# Patient Record
Sex: FEMALE | Race: WHITE | NOT HISPANIC OR LATINO | Employment: UNEMPLOYED | URBAN - METROPOLITAN AREA
[De-identification: names, ages, dates, MRNs, and addresses within clinical notes are randomized per-mention and may not be internally consistent; named-entity substitution may affect disease eponyms.]

---

## 2017-02-06 ENCOUNTER — HOSPITAL ENCOUNTER (OUTPATIENT)
Dept: PHYSICAL THERAPY | Facility: MEDICAL CENTER | Age: 32
End: 2017-02-06
Attending: FAMILY MEDICINE
Payer: COMMERCIAL

## 2017-02-06 PROCEDURE — 97162 PT EVAL MOD COMPLEX 30 MIN: CPT

## 2017-02-06 PROCEDURE — 97110 THERAPEUTIC EXERCISES: CPT

## 2017-02-13 ENCOUNTER — HOSPITAL ENCOUNTER (OUTPATIENT)
Dept: PHYSICAL THERAPY | Facility: MEDICAL CENTER | Age: 32
End: 2017-02-13
Attending: FAMILY MEDICINE
Payer: COMMERCIAL

## 2017-02-13 PROCEDURE — 97140 MANUAL THERAPY 1/> REGIONS: CPT

## 2017-02-13 PROCEDURE — 97110 THERAPEUTIC EXERCISES: CPT

## 2017-02-20 ENCOUNTER — HOSPITAL ENCOUNTER (OUTPATIENT)
Dept: PHYSICAL THERAPY | Facility: MEDICAL CENTER | Age: 32
End: 2017-02-20
Attending: FAMILY MEDICINE
Payer: COMMERCIAL

## 2017-02-20 PROCEDURE — 97110 THERAPEUTIC EXERCISES: CPT

## 2017-02-20 PROCEDURE — 97140 MANUAL THERAPY 1/> REGIONS: CPT

## 2017-02-27 ENCOUNTER — HOSPITAL ENCOUNTER (OUTPATIENT)
Dept: PHYSICAL THERAPY | Facility: MEDICAL CENTER | Age: 32
End: 2017-02-27
Attending: FAMILY MEDICINE
Payer: COMMERCIAL

## 2017-02-27 PROCEDURE — 97110 THERAPEUTIC EXERCISES: CPT

## 2017-02-27 PROCEDURE — 97140 MANUAL THERAPY 1/> REGIONS: CPT

## 2017-02-28 ENCOUNTER — HOSPITAL ENCOUNTER (OUTPATIENT)
Dept: LAB | Facility: MEDICAL CENTER | Age: 32
End: 2017-02-28
Attending: FAMILY MEDICINE
Payer: COMMERCIAL

## 2017-02-28 DIAGNOSIS — Z00.00 HEALTH MAINTENANCE EXAMINATION: ICD-10-CM

## 2017-02-28 LAB
25(OH)D3 SERPL-MCNC: 21 NG/ML (ref 30–100)
ALBUMIN SERPL BCP-MCNC: 4.6 G/DL (ref 3.2–4.9)
ALBUMIN/GLOB SERPL: 1.2 G/DL
ALP SERPL-CCNC: 46 U/L (ref 30–99)
ALT SERPL-CCNC: 19 U/L (ref 2–50)
ANION GAP SERPL CALC-SCNC: 8 MMOL/L (ref 0–11.9)
AST SERPL-CCNC: 20 U/L (ref 12–45)
BASOPHILS # BLD AUTO: 0.06 K/UL (ref 0–0.12)
BASOPHILS NFR BLD AUTO: 0.7 % (ref 0–1.8)
BILIRUB SERPL-MCNC: 1.7 MG/DL (ref 0.1–1.5)
BUN SERPL-MCNC: 17 MG/DL (ref 8–22)
CALCIUM SERPL-MCNC: 9.9 MG/DL (ref 8.5–10.5)
CHLORIDE SERPL-SCNC: 104 MMOL/L (ref 96–112)
CHOLEST SERPL-MCNC: 174 MG/DL (ref 100–199)
CO2 SERPL-SCNC: 25 MMOL/L (ref 20–33)
CREAT SERPL-MCNC: 0.8 MG/DL (ref 0.5–1.4)
EOSINOPHIL # BLD: 0.1 K/UL (ref 0–0.51)
EOSINOPHIL NFR BLD AUTO: 1.2 % (ref 0–6.9)
ERYTHROCYTE [DISTWIDTH] IN BLOOD BY AUTOMATED COUNT: 47 FL (ref 35.9–50)
GLOBULIN SER CALC-MCNC: 3.8 G/DL (ref 1.9–3.5)
GLUCOSE SERPL-MCNC: 89 MG/DL (ref 65–99)
HCT VFR BLD AUTO: 46 % (ref 37–47)
HDLC SERPL-MCNC: 97 MG/DL
HGB BLD-MCNC: 15.2 G/DL (ref 12–16)
IMM GRANULOCYTES # BLD AUTO: 0.02 K/UL (ref 0–0.11)
IMM GRANULOCYTES NFR BLD AUTO: 0.2 % (ref 0–0.9)
LDLC SERPL CALC-MCNC: 69 MG/DL
LYMPHOCYTES # BLD: 2.52 K/UL (ref 1–4.8)
LYMPHOCYTES NFR BLD AUTO: 31.1 % (ref 22–41)
MCH RBC QN AUTO: 30.3 PG (ref 27–33)
MCHC RBC AUTO-ENTMCNC: 33 G/DL (ref 33.6–35)
MCV RBC AUTO: 91.8 FL (ref 81.4–97.8)
MONOCYTES # BLD: 0.6 K/UL (ref 0–0.85)
MONOCYTES NFR BLD AUTO: 7.4 % (ref 0–13.4)
NEUTROPHILS # BLD: 4.81 K/UL (ref 2–7.15)
NEUTROPHILS NFR BLD AUTO: 59.4 % (ref 44–72)
NRBC # BLD AUTO: 0 K/UL
NRBC BLD-RTO: 0 /100 WBC
PLATELET # BLD AUTO: 263 K/UL (ref 164–446)
PMV BLD AUTO: 9.8 FL (ref 9–12.9)
POTASSIUM SERPL-SCNC: 3.8 MMOL/L (ref 3.6–5.5)
PROT SERPL-MCNC: 8.4 G/DL (ref 6–8.2)
RBC # BLD AUTO: 5.01 M/UL (ref 4.2–5.4)
SODIUM SERPL-SCNC: 137 MMOL/L (ref 135–145)
TRIGL SERPL-MCNC: 42 MG/DL (ref 0–149)
TSH SERPL DL<=0.005 MIU/L-ACNC: 0.74 UIU/ML (ref 0.3–3.7)
WBC # BLD AUTO: 8.1 K/UL (ref 4.8–10.8)

## 2017-02-28 PROCEDURE — 36415 COLL VENOUS BLD VENIPUNCTURE: CPT

## 2017-02-28 PROCEDURE — 85025 COMPLETE CBC W/AUTO DIFF WBC: CPT

## 2017-02-28 PROCEDURE — 84443 ASSAY THYROID STIM HORMONE: CPT

## 2017-02-28 PROCEDURE — 80053 COMPREHEN METABOLIC PANEL: CPT

## 2017-02-28 PROCEDURE — 82306 VITAMIN D 25 HYDROXY: CPT

## 2017-02-28 PROCEDURE — 80061 LIPID PANEL: CPT

## 2017-03-01 DIAGNOSIS — E80.6 HYPERBILIRUBINEMIA: ICD-10-CM

## 2017-03-24 ENCOUNTER — OFFICE VISIT (OUTPATIENT)
Dept: MEDICAL GROUP | Facility: LAB | Age: 32
End: 2017-03-24
Payer: COMMERCIAL

## 2017-03-24 ENCOUNTER — HOSPITAL ENCOUNTER (OUTPATIENT)
Dept: LAB | Facility: MEDICAL CENTER | Age: 32
End: 2017-03-24
Attending: FAMILY MEDICINE
Payer: COMMERCIAL

## 2017-03-24 VITALS
TEMPERATURE: 99 F | WEIGHT: 105 LBS | DIASTOLIC BLOOD PRESSURE: 80 MMHG | HEIGHT: 60 IN | OXYGEN SATURATION: 98 % | HEART RATE: 70 BPM | SYSTOLIC BLOOD PRESSURE: 100 MMHG | BODY MASS INDEX: 20.62 KG/M2

## 2017-03-24 DIAGNOSIS — M54.2 NECK PAIN: ICD-10-CM

## 2017-03-24 DIAGNOSIS — E55.9 VITAMIN D DEFICIENCY: ICD-10-CM

## 2017-03-24 DIAGNOSIS — E80.6 HYPERBILIRUBINEMIA: ICD-10-CM

## 2017-03-24 DIAGNOSIS — R35.0 URINARY FREQUENCY: ICD-10-CM

## 2017-03-24 LAB
APPEARANCE UR: CLEAR
BILIRUB CONJ SERPL-MCNC: 0.2 MG/DL (ref 0.1–0.5)
BILIRUB INDIRECT SERPL-MCNC: 0.5 MG/DL (ref 0–1)
BILIRUB SERPL-MCNC: 0.7 MG/DL (ref 0.1–1.5)
BILIRUB UR QL STRIP.AUTO: NEGATIVE
COLOR UR AUTO: COLORLESS
CRP SERPL HS-MCNC: 0.03 MG/DL (ref 0–0.75)
CULTURE IF INDICATED INDCX: NO UA CULTURE
ERYTHROCYTE [SEDIMENTATION RATE] IN BLOOD BY WESTERGREN METHOD: 6 MM/HOUR (ref 0–20)
GLUCOSE UR STRIP.AUTO-MCNC: NEGATIVE MG/DL
HAV IGM SERPL QL IA: NEGATIVE
HBV CORE IGM SERPL QL IA: NEGATIVE
HBV SURFACE AG SERPL QL IA: NEGATIVE
HCV AB S/CO SERPL IA: NEGATIVE
KETONES UR STRIP.AUTO-MCNC: NEGATIVE MG/DL
LEUKOCYTE ESTERASE UR QL STRIP.AUTO: NEGATIVE
MICRO URNS: NORMAL
NITRITE UR QL STRIP.AUTO: NEGATIVE
PH UR: 6.5 [PH]
PROT UR QL STRIP: NEGATIVE MG/DL
RBC UR QL AUTO: NEGATIVE
RHEUMATOID FACT SERPL-ACNC: <10 IU/ML (ref 0–14)
SP GR UR STRIP.AUTO: 1.01
URATE SERPL-MCNC: 3.7 MG/DL (ref 1.9–8.2)

## 2017-03-24 PROCEDURE — 99214 OFFICE O/P EST MOD 30 MIN: CPT | Performed by: FAMILY MEDICINE

## 2017-03-24 PROCEDURE — 80074 ACUTE HEPATITIS PANEL: CPT

## 2017-03-24 PROCEDURE — 86140 C-REACTIVE PROTEIN: CPT

## 2017-03-24 PROCEDURE — 82247 BILIRUBIN TOTAL: CPT

## 2017-03-24 PROCEDURE — 85652 RBC SED RATE AUTOMATED: CPT

## 2017-03-24 PROCEDURE — 86038 ANTINUCLEAR ANTIBODIES: CPT

## 2017-03-24 PROCEDURE — 36415 COLL VENOUS BLD VENIPUNCTURE: CPT

## 2017-03-24 PROCEDURE — 81003 URINALYSIS AUTO W/O SCOPE: CPT

## 2017-03-24 PROCEDURE — 82248 BILIRUBIN DIRECT: CPT

## 2017-03-24 PROCEDURE — 86431 RHEUMATOID FACTOR QUANT: CPT

## 2017-03-24 PROCEDURE — 84550 ASSAY OF BLOOD/URIC ACID: CPT

## 2017-03-24 NOTE — PROGRESS NOTES
Subjective:   Ezio Kay is a 31 y.o. female here today for   Chief Complaint   Patient presents with   • Labs Only     pelvical evaluation, bladder       1. Hyperbilirubinemia  This is a new problem. Patient has never been told that she has elevated bilirubin in the past. Her other liver function tests are very normal. She does not have any jaundice, acholic stool, abdominal pain. She is concerned for autoimmune etiologies and would like to be tested.  Results for EZIO KAY (MRN [id number]) as of 3/24/2017 12:47   Ref. Range 2/28/2017 09:58   Sodium Latest Ref Range: 135-145 mmol/L 137   Potassium Latest Ref Range: 3.6-5.5 mmol/L 3.8   Chloride Latest Ref Range:  mmol/L 104   Co2 Latest Ref Range: 20-33 mmol/L 25   Anion Gap Latest Ref Range: 0.0-11.9  8.0   Glucose Latest Ref Range: 65-99 mg/dL 89   Bun Latest Ref Range: 8-22 mg/dL 17   Creatinine Latest Ref Range: 0.50-1.40 mg/dL 0.80   GFR If  Latest Ref Range: >60 mL/min/1.73 m 2 >60   GFR If Non  Latest Ref Range: >60 mL/min/1.73 m 2 >60   Calcium Latest Ref Range: 8.5-10.5 mg/dL 9.9   AST(SGOT) Latest Ref Range: 12-45 U/L 20   ALT(SGPT) Latest Ref Range: 2-50 U/L 19   Alkaline Phosphatase Latest Ref Range: 30-99 U/L 46   Total Bilirubin Latest Ref Range: 0.1-1.5 mg/dL 1.7 (H)   Albumin Latest Ref Range: 3.2-4.9 g/dL 4.6   Total Protein Latest Ref Range: 6.0-8.2 g/dL 8.4 (H)   Globulin Latest Ref Range: 1.9-3.5 g/dL 3.8 (H)   A-G Ratio Latest Units: g/dL 1.2     2. Neck pain  This is chronic. It is posterior and has been present for the last few months. She has been seeing physical therapy which is helping. Her dizziness has improved.    3. Urinary frequency  This is a new problem. Over the last 2 months she has had increasing urinary frequency, occasional dysuria, bladder spasms. She denies any incontinence but does sometimes have a difficult time making it to the bathroom due to the urgency. She has had a  . She was talking to the physical therapist about this and she had recommended pelvic evaluation.    4. Vitamin D deficiency  This is a new problem. Labs showed vitamin D deficiency. She is taking 2000 units daily. She is possibly feeling more energy and better concentration  Results for EZIO KAY (MRN [id number]) as of 3/24/2017 12:47   Ref. Range 2017 09:58   25-Hydroxy   Vitamin D 25 Latest Ref Range:  ng/mL 21 (L)         Current medicines (including changes today)  No current outpatient prescriptions on file.     No current facility-administered medications for this visit.     She  has no past medical history of ASTHMA or Diabetes.    ROS   No fevers  No bowel changes  No LE edema       Objective:     Blood pressure 100/80, pulse 70, temperature 37.2 °C (99 °F), height 1.524 m (5'), weight 47.628 kg (105 lb), last menstrual period 03/10/2017, SpO2 98 %. Body mass index is 20.51 kg/(m^2).   Physical Exam:  Constitutional: Alert, no distress.  Skin: Warm, dry, good turgor, no rashes in visible areas.  Eye: Equal, round and reactive, conjunctiva clear, lids normal.  ENMT: Lips without lesions, good dentition, oropharynx clear.  Neck: Trachea midline, no masses, no thyromegaly. No cervical or supraclavicular lymphadenopathy  Respiratory: Unlabored respiratory effort, lungs clear to auscultation, no wheezes, no ronchi.  Cardiovascular: Normal S1, S2, RRR, no murmur, no edema.  Abdomen: Soft, non-tender, no CVA tenderness, no masses, no hepatosplenomegaly.  Psych: Alert and oriented x3, normal affect and mood.        Assessment and Plan:   The following treatment plan was discussed    1. Hyperbilirubinemia  New, etiology unclear. This is likely either a mild genetic abnormality like Gilbert syndrome or an incidental finding that will improve on its own  Labs ordered to look for conjugated versus unconjugated bili  Patient interested in checking for autoimmune etiologies, labs ordered  -  URIC A+ESR-CHARITO+MELONIE+RA QN+CR  - BILIRUBIN TOTAL; Future  - BILIRUBIN DIRECT; Future  - HEPATITIS PANEL ACUTE(4 COMPONENTS); Future    2. Neck pain  Chronic, improving  Patient interested in looking for autoimmune etiologies and genetic arthritic conditions, labs ordered  - URIC A+ESR-CHARITO+MELONIE+RA QN+CR    3. Urinary frequency  New, etiology unclear. We will check for a UTI  UA ordered  If UA is negative, refer to physical therapy  If no improvement with physical therapy, patient will make an appointment with urogynecology  - URINALYSIS CX IF IND  - REFERRAL TO PHYSICAL THERAPY Reason for Therapy: Eval/Treat/Report  - REFERRAL TO UROGYNECOLOGY      Followup: Return if symptoms worsen or fail to improve.       This note was created using voice recognition software. I have made every reasonable attempt to correct errors, however, I do anticipate some grammatical errors.

## 2017-03-24 NOTE — MR AVS SNAPSHOT
Luisa Nelson   3/24/2017 10:20 AM   Office Visit   MRN: [id number]    Department:  [facility]   Dept Phone:  [phone]    Description:  Female : 1985   Provider:  Angelica Montes M.D.           Reason for Visit     Labs Only pelvical evaluation, bladder      Allergies as of 3/24/2017     No Known Allergies      You were diagnosed with     Hyperbilirubinemia   [820695]       Neck pain   [2013]       Urinary frequency   [788.41.ICD-9-CM]         Vital Signs     Blood Pressure Pulse Temperature Height Weight Body Mass Index    100/80 mmHg 70 37.2 °C (99 °F) 1.524 m (5') 47.628 kg (105 lb) 20.51 kg/m2    Oxygen Saturation Last Menstrual Period Smoking Status             98% 03/10/2017 Never Smoker          Basic Information     Date Of Birth Sex Race Ethnicity Preferred Language    1985 Female White Non- English      Problem List              ICD-10-CM Priority Class Noted - Resolved    Neck pain M54.2   12/15/2016 - Present    Chronic bilateral low back pain without sciatica M54.5, G89.29   12/15/2016 - Present    Dizziness R42   12/15/2016 - Present    TMJ (temporomandibular joint syndrome) M26.609   12/15/2016 - Present    Hyperbilirubinemia E80.6   3/24/2017 - Present    Urinary frequency R35.0   3/24/2017 - Present      Health Maintenance        Date Due Completion Dates    PAP SMEAR 3/15/2019 3/15/2016    IMM DTaP/Tdap/Td Vaccine (2 - Td) 2024            Current Immunizations     Influenza TIV (IM) 2015, 10/11/2014    Influenza Vaccine Quad Inj (Preserved) 11/15/2016    Tdap Vaccine 2014      Below and/or attached are the medications your provider expects you to take. Review all of your home medications and newly ordered medications with your provider and/or pharmacist. Follow medication instructions as directed by your provider and/or pharmacist. Please keep your medication list with you and share with your provider. Update the  information when medications are discontinued, doses are changed, or new medications (including over-the-counter products) are added; and carry medication information at all times in the event of emergency situations     Allergies:  No Known Allergies          Medications  Valid as of: March 24, 2017 - 10:39 AM    Generic Name Brand Name Tablet Size Instructions for use    .                 Medicines prescribed today were sent to:     [facility] [address] [place] [address], [place] [address] [facility]    [address] [facility] [place] [address]    Phone: [phone] Fax: [phone]    Open 24 Hours?: No      Medication refill instructions:       If your prescription bottle indicates you have medication refills left, it is not necessary to call your provider’s office. Please contact your pharmacy and they will refill your medication.    If your prescription bottle indicates you do not have any refills left, you may request refills at any time through one of the following ways: The online [employer] system (except Urgent Care), by calling your provider’s office, or by asking your pharmacy to contact your provider’s office with a refill request. Medication refills are processed only during regular business hours and may not be available until the next business day. Your provider may request additional information or to have a follow-up visit with you prior to refilling your medication.   *Please Note: Medication refills are assigned a new Rx number when refilled electronically. Your pharmacy may indicate that no refills were authorized even though a new prescription for the same medication is available at the pharmacy. Please request the medicine by name with the pharmacy before contacting your provider for a refill.        Your To Do List     Future Labs/Procedures Complete By Expires    BILIRUBIN DIRECT  As directed 3/24/2018    BILIRUBIN TOTAL  As directed 3/24/2018    HEPATITIS PANEL ACUTE(4 COMPONENTS)  As directed  3/25/2018      Referral     A referral request has been sent to our patient care coordination department. Please allow 3-5 business days for us to process this request and contact you either by phone or mail. If you do not hear from us by the 5th business day, please call us at [phone].           [employer] Access Code: Activation code not generated  Current [employer] Status: Active

## 2017-03-26 LAB — NUCLEAR IGG SER QL IA: NORMAL

## 2017-06-05 ENCOUNTER — HOSPITAL ENCOUNTER (OUTPATIENT)
Dept: PHYSICAL THERAPY | Facility: MEDICAL CENTER | Age: 32
End: 2017-06-05
Attending: FAMILY MEDICINE
Payer: COMMERCIAL

## 2017-06-05 PROCEDURE — 97162 PT EVAL MOD COMPLEX 30 MIN: CPT

## 2017-06-05 PROCEDURE — 97110 THERAPEUTIC EXERCISES: CPT

## 2017-06-12 ENCOUNTER — APPOINTMENT (OUTPATIENT)
Dept: PHYSICAL THERAPY | Facility: MEDICAL CENTER | Age: 32
End: 2017-06-12
Attending: FAMILY MEDICINE
Payer: COMMERCIAL

## 2017-06-19 ENCOUNTER — HOSPITAL ENCOUNTER (OUTPATIENT)
Dept: PHYSICAL THERAPY | Facility: MEDICAL CENTER | Age: 32
End: 2017-06-19
Attending: FAMILY MEDICINE
Payer: COMMERCIAL

## 2017-07-17 ENCOUNTER — HOSPITAL ENCOUNTER (OUTPATIENT)
Dept: PHYSICAL THERAPY | Facility: MEDICAL CENTER | Age: 32
End: 2017-07-17
Attending: FAMILY MEDICINE
Payer: COMMERCIAL

## 2017-07-17 PROCEDURE — 97140 MANUAL THERAPY 1/> REGIONS: CPT

## 2017-07-17 PROCEDURE — 97110 THERAPEUTIC EXERCISES: CPT

## 2017-07-18 PROCEDURE — 97110 THERAPEUTIC EXERCISES: CPT

## 2017-07-18 PROCEDURE — 97140 MANUAL THERAPY 1/> REGIONS: CPT

## 2017-07-31 ENCOUNTER — HOSPITAL ENCOUNTER (OUTPATIENT)
Dept: PHYSICAL THERAPY | Facility: MEDICAL CENTER | Age: 32
End: 2017-07-31
Attending: FAMILY MEDICINE
Payer: COMMERCIAL

## 2017-07-31 PROCEDURE — 97110 THERAPEUTIC EXERCISES: CPT

## 2018-11-03 ENCOUNTER — OFFICE VISIT (OUTPATIENT)
Dept: URGENT CARE | Facility: PHYSICIAN GROUP | Age: 33
End: 2018-11-03
Payer: COMMERCIAL

## 2018-11-03 VITALS
TEMPERATURE: 97.8 F | RESPIRATION RATE: 12 BRPM | SYSTOLIC BLOOD PRESSURE: 104 MMHG | HEIGHT: 60 IN | BODY MASS INDEX: 20.22 KG/M2 | WEIGHT: 103 LBS | HEART RATE: 64 BPM | OXYGEN SATURATION: 97 % | DIASTOLIC BLOOD PRESSURE: 68 MMHG

## 2018-11-03 DIAGNOSIS — K21.9 GASTROESOPHAGEAL REFLUX DISEASE, ESOPHAGITIS PRESENCE NOT SPECIFIED: ICD-10-CM

## 2018-11-03 PROCEDURE — 99214 OFFICE O/P EST MOD 30 MIN: CPT | Performed by: FAMILY MEDICINE

## 2018-11-03 RX ORDER — LORATADINE 10 MG/1
10 TABLET ORAL DAILY
COMMUNITY

## 2018-11-03 ASSESSMENT — ENCOUNTER SYMPTOMS
MYALGIAS: 0
EYES NEGATIVE: 1
CARDIOVASCULAR NEGATIVE: 1
CONSTIPATION: 0
FEVER: 0
DIARRHEA: 0
BRUISES/BLEEDS EASILY: 0
BACK PAIN: 0
DIZZINESS: 0
HEADACHES: 0
BLOOD IN STOOL: 0
VOMITING: 0
NECK PAIN: 0
NEUROLOGICAL NEGATIVE: 1
FLANK PAIN: 0
ABDOMINAL PAIN: 1
SHORTNESS OF BREATH: 0
WHEEZING: 0
RESPIRATORY NEGATIVE: 1
NAUSEA: 1
HEARTBURN: 0
CHILLS: 0
PALPITATIONS: 0

## 2018-11-03 NOTE — PROGRESS NOTES
Subjective:   Luisa Nelson is a 33 y.o. female who presents for Abdominal Pain (abd pain x1 wk, some nausea)        HPI   Patient presents with new onset intermittent, dull stomach pain and nausea x1 week.  She states the pain occurs after she eats spicy meals, such as chili or when her stomach is empty or after she has eaten a meal.  Has not tried any OTC remedies.  States the pain is right in her epigastric area and feels like burning.  Denies vomiting, diarrhea, or constipation.  Denies fever, chills, or dizziness. Denies any bloody or dark stools. Denies any bilious vomiting, urinary symptoms, and denies chance she could be pregnant. Denies history of gallbladder diease, GERD, stomach ulcers, or recent NSAIDs/alcohol use.  Denies chest pain, shortness of breath or wheezing.     Review of Systems   Constitutional: Negative for chills and fever.   Eyes: Negative.    Respiratory: Negative.  Negative for shortness of breath and wheezing.    Cardiovascular: Negative.  Negative for chest pain and palpitations.   Gastrointestinal: Positive for abdominal pain and nausea. Negative for blood in stool, constipation, diarrhea, heartburn, melena and vomiting.   Genitourinary: Negative for dysuria, flank pain, frequency, hematuria and urgency.   Musculoskeletal: Negative for back pain, myalgias and neck pain.   Skin: Negative.  Negative for itching and rash.   Neurological: Negative.  Negative for dizziness and headaches.   Endo/Heme/Allergies: Does not bruise/bleed easily.     No Known Allergies   PMH:  has no past medical history of Allergy; Anemia; ASTHMA; Diabetes; or GERD (gastroesophageal reflux disease).  MEDS:   Current Outpatient Prescriptions:   •  loratadine (CLARITIN) 10 MG Tab, Take 10 mg by mouth every day., Disp: , Rfl:   ALLERGIES: No Known Allergies  SURGHX:   Past Surgical History:   Procedure Laterality Date   • PRIMARY C SECTION       SOCHX:  reports that she has never smoked. She has never used  "smokeless tobacco. She reports that she drinks alcohol. She reports that she does not use drugs.  FH: Family history was reviewed, no pertinent findings to report     Objective:   /68 (BP Location: Left arm, Patient Position: Sitting, BP Cuff Size: Small adult)   Pulse 64   Temp 36.6 °C (97.8 °F) (Temporal)   Resp 12   Ht 1.511 m (4' 11.5\")   Wt 46.7 kg (103 lb)   SpO2 97%   BMI 20.46 kg/m²   Physical Exam   Constitutional: She is oriented to person, place, and time. She appears well-developed and well-nourished.   HENT:   Right Ear: Hearing and tympanic membrane normal.   Left Ear: Hearing and tympanic membrane normal.   Mouth/Throat: Oropharynx is clear and moist and mucous membranes are normal.   Eyes: Pupils are equal, round, and reactive to light. Conjunctivae are normal.   Cardiovascular: Normal rate, regular rhythm and normal heart sounds.    No murmur heard.  Pulmonary/Chest: Effort normal and breath sounds normal. No respiratory distress.   Abdominal: Soft. Normal appearance and bowel sounds are normal. She exhibits no distension. There is no hepatosplenomegaly. There is tenderness in the epigastric area. There is no rebound, no CVA tenderness, no tenderness at McBurney's point and negative Sandoval's sign. No hernia.   Mildly tender in epigastric region. No signs of acute abdomen   Neurological: She is alert and oriented to person, place, and time.   Skin: Skin is warm and dry. Capillary refill takes less than 2 seconds.   Psychiatric: She has a normal mood and affect. Her behavior is normal. Judgment and thought content normal.   Vitals reviewed.        Assessment/Plan:   Assessment    1. Gastroesophageal reflux disease, esophagitis presence not specified  REFERRAL TO GASTROENTEROLOGY    omeprazole (PRILOSEC) 10 MG CAPSULE DELAYED RELEASE       Take once daily in morning with full glass of water.    Advised to avoid caffeine, spicy foods, fatty foods    Follow up with Gastroenterologist in 2 " weeks    Differential diagnosis, natural history, supportive care, and indications for immediate follow-up discussed.     The case was discussed and reviewed with Dr Cortés during Patty CHAUDHARI's training period.

## 2018-11-05 RX ORDER — OMEPRAZOLE 10 MG/1
20 CAPSULE, DELAYED RELEASE ORAL DAILY
Qty: 60 CAP | Refills: 0 | Status: SHIPPED | OUTPATIENT
Start: 2018-11-05

## 2019-01-10 ENCOUNTER — OFFICE VISIT (OUTPATIENT)
Dept: URGENT CARE | Facility: PHYSICIAN GROUP | Age: 34
End: 2019-01-10
Payer: COMMERCIAL

## 2019-01-10 VITALS
WEIGHT: 105 LBS | OXYGEN SATURATION: 100 % | BODY MASS INDEX: 20.85 KG/M2 | HEART RATE: 75 BPM | RESPIRATION RATE: 14 BRPM | DIASTOLIC BLOOD PRESSURE: 64 MMHG | TEMPERATURE: 98.4 F | SYSTOLIC BLOOD PRESSURE: 102 MMHG

## 2019-01-10 DIAGNOSIS — J01.00 ACUTE NON-RECURRENT MAXILLARY SINUSITIS: ICD-10-CM

## 2019-01-10 PROCEDURE — 99214 OFFICE O/P EST MOD 30 MIN: CPT | Performed by: INTERNAL MEDICINE

## 2019-01-10 RX ORDER — AMOXICILLIN AND CLAVULANATE POTASSIUM 875; 125 MG/1; MG/1
1 TABLET, FILM COATED ORAL 2 TIMES DAILY
Qty: 20 TAB | Refills: 0 | Status: SHIPPED | OUTPATIENT
Start: 2019-01-10 | End: 2019-01-20

## 2019-01-10 ASSESSMENT — ENCOUNTER SYMPTOMS
MYALGIAS: 0
NAUSEA: 0
SINUS PRESSURE: 1
EYE DISCHARGE: 0
VOMITING: 0
SORE THROAT: 0
SHORTNESS OF BREATH: 0
CHILLS: 0
HEADACHES: 1
DIARRHEA: 0
ABDOMINAL PAIN: 0
COUGH: 1
SINUS PAIN: 1
EYE PAIN: 0
SPUTUM PRODUCTION: 0
CONSTIPATION: 0

## 2019-01-10 NOTE — PROGRESS NOTES
Subjective:   Luisa Nelson is a 33 y.o. female who presents for Nasal Congestion (x1 day, sinus pressure, ears ache)       Sinus Problem   This is a new problem. Episode onset: 10 days ago. The problem has been gradually worsening since onset. Maximum temperature: Subjective fever. The fever has been present for less than 1 day. The pain is moderate. Associated symptoms include congestion, coughing, ear pain, headaches and sinus pressure. Pertinent negatives include no chills, shortness of breath or sore throat. Treatments tried: Dayquil, Nyquil, cold compresses. The treatment provided mild relief.     Review of Systems   Constitutional: Negative for chills.        Positive for subjective fever   HENT: Positive for congestion, ear pain, sinus pain and sinus pressure. Negative for ear discharge and sore throat.    Eyes: Negative for pain and discharge.   Respiratory: Positive for cough. Negative for sputum production and shortness of breath.    Cardiovascular: Negative for chest pain.   Gastrointestinal: Negative for abdominal pain, constipation, diarrhea, nausea and vomiting.   Musculoskeletal: Negative for myalgias.   Neurological: Positive for headaches.       PMH:  has no past medical history of Allergy; Anemia; ASTHMA; Diabetes; or GERD (gastroesophageal reflux disease).    MEDS:   Current Outpatient Prescriptions:   •  amoxicillin-clavulanate (AUGMENTIN) 875-125 MG Tab, Take 1 Tab by mouth 2 times a day for 10 days., Disp: 20 Tab, Rfl: 0  •  omeprazole (PRILOSEC) 10 MG CAPSULE DELAYED RELEASE, Take 2 Caps by mouth every day. (Patient not taking: Reported on 1/10/2019), Disp: 60 Cap, Rfl: 0  •  loratadine (CLARITIN) 10 MG Tab, Take 10 mg by mouth every day., Disp: , Rfl:     ALLERGIES: No Known Allergies    SURGHX:   Past Surgical History:   Procedure Laterality Date   • PRIMARY C SECTION         SOCHX:  reports that she has never smoked. She has never used smokeless tobacco. She reports that she drinks  alcohol. She reports that she does not use drugs.    FH: Reviewed with patient, not pertinent to this visit.     Objective:   /64 (BP Location: Left arm, Patient Position: Sitting, BP Cuff Size: Adult)   Pulse 75   Temp 36.9 °C (98.4 °F) (Oral)   Resp 14   Wt 47.6 kg (105 lb)   LMP 01/09/2019   SpO2 100%   BMI 20.85 kg/m²   Physical Exam   Constitutional: She is oriented to person, place, and time. She appears well-developed and well-nourished. No distress.   HENT:   Head: Normocephalic and atraumatic.   Right Ear: Tympanic membrane, external ear and ear canal normal.   Left Ear: Tympanic membrane, external ear and ear canal normal.   Nose: Mucosal edema present. No rhinorrhea. Right sinus exhibits no maxillary sinus tenderness and no frontal sinus tenderness. Left sinus exhibits maxillary sinus tenderness. Left sinus exhibits no frontal sinus tenderness.   Mouth/Throat: Uvula is midline, oropharynx is clear and moist and mucous membranes are normal.   Eyes: Conjunctivae and EOM are normal.   Neck: Normal range of motion. Neck supple. No tracheal deviation present.   Cardiovascular: Normal rate, regular rhythm and normal heart sounds.    Pulmonary/Chest: Effort normal and breath sounds normal. No respiratory distress.   Musculoskeletal:   ROM normal all four extremities   Lymphadenopathy:     She has no cervical adenopathy.   Neurological: She is alert and oriented to person, place, and time.   Skin: Skin is warm and dry.   Psychiatric: She has a normal mood and affect. Her behavior is normal. Judgment and thought content normal.       Assessment/Plan:   1. Acute non-recurrent maxillary sinusitis  - amoxicillin-clavulanate (AUGMENTIN) 875-125 MG Tab; Take 1 Tab by mouth 2 times a day for 10 days.  Dispense: 20 Tab; Refill: 0  - Advised to take abx with food/yogurt and to complete course  - Advised to try OTC fluticasone nasal spray, decongestant  - Advised to return if symptoms worsen or do not  improve    Differential diagnosis, natural history, supportive care, and indications for immediate follow-up discussed.    Case and results reviewed and agree with treatment plan as outlined.  Dr. Mancia

## 2019-02-21 ENCOUNTER — APPOINTMENT (OUTPATIENT)
Dept: MEDICAL GROUP | Facility: LAB | Age: 34
End: 2019-02-21
Payer: COMMERCIAL

## 2020-06-07 ENCOUNTER — HOSPITAL ENCOUNTER (OUTPATIENT)
Dept: RADIOLOGY | Facility: MEDICAL CENTER | Age: 35
End: 2020-06-07
Attending: INTERNAL MEDICINE
Payer: COMMERCIAL

## 2020-06-07 ENCOUNTER — OFFICE VISIT (OUTPATIENT)
Dept: URGENT CARE | Facility: PHYSICIAN GROUP | Age: 35
End: 2020-06-07
Payer: COMMERCIAL

## 2020-06-07 VITALS
HEART RATE: 90 BPM | SYSTOLIC BLOOD PRESSURE: 110 MMHG | BODY MASS INDEX: 27.17 KG/M2 | TEMPERATURE: 99.2 F | OXYGEN SATURATION: 100 % | DIASTOLIC BLOOD PRESSURE: 70 MMHG | RESPIRATION RATE: 16 BRPM | WEIGHT: 138.4 LBS | HEIGHT: 60 IN

## 2020-06-07 DIAGNOSIS — M79.605 PAIN OF LEFT LOWER EXTREMITY: ICD-10-CM

## 2020-06-07 DIAGNOSIS — M25.562 ACUTE PAIN OF LEFT KNEE: ICD-10-CM

## 2020-06-07 PROCEDURE — 93971 EXTREMITY STUDY: CPT | Mod: LT

## 2020-06-07 PROCEDURE — 99214 OFFICE O/P EST MOD 30 MIN: CPT | Performed by: INTERNAL MEDICINE

## 2020-06-07 RX ORDER — CHOLECALCIFEROL (VITAMIN D3) 125 MCG
CAPSULE ORAL
COMMUNITY

## 2020-06-07 ASSESSMENT — ENCOUNTER SYMPTOMS
LEG PAIN: 1
NUMBNESS: 0
JOINT SWELLING: 0
FEVER: 0
WEAKNESS: 0
COUGH: 0

## 2020-06-07 NOTE — PROGRESS NOTES
Subjective:   Luisa Nelson is a 34 y.o. female who presents for Leg Pain (pain behind left knee x 1 week)  Complains of left leg pain mainly behind the left knee and occasional swelling she is worried about DVT,  No risk factors for DVT      Leg Pain   This is a new problem. The current episode started in the past 7 days. The problem occurs constantly. The problem has been unchanged. Pertinent negatives include no coughing, fever, joint swelling, numbness or weakness.     Review of Systems   Constitutional: Negative for fever.   Respiratory: Negative for cough.    Musculoskeletal: Negative for joint swelling.   Neurological: Negative for weakness and numbness.   All other systems reviewed and are negative.    No Known Allergies   Objective:   /70 (BP Location: Left arm, Patient Position: Sitting, BP Cuff Size: Adult)   Pulse 90   Temp 37.3 °C (99.2 °F) (Temporal)   Resp 16   Ht 1.524 m (5')   Wt 62.8 kg (138 lb 6.4 oz)   SpO2 100%   BMI 27.03 kg/m²   Physical Exam  Constitutional:       General: She is not in acute distress.     Appearance: She is well-developed.   HENT:      Head: Normocephalic and atraumatic.      Mouth/Throat:      Mouth: Mucous membranes are moist.      Pharynx: Oropharynx is clear.   Eyes:      Conjunctiva/sclera: Conjunctivae normal.   Neck:      Musculoskeletal: No neck rigidity.   Cardiovascular:      Rate and Rhythm: Normal rate and regular rhythm.   Pulmonary:      Effort: Pulmonary effort is normal. No respiratory distress.      Breath sounds: Normal breath sounds.   Musculoskeletal:         General: Tenderness (Left knee-minimal tenderness on the lateral aspect of the knee joint, no joint effusion, anterior drawer test was negative, no laxity of ligaments,) present.   Lymphadenopathy:      Cervical: No cervical adenopathy.   Skin:     General: Skin is warm and dry.      Capillary Refill: Capillary refill takes less than 2 seconds.   Neurological:      Mental Status:  She is alert and oriented to person, place, and time.      Sensory: No sensory deficit.      Deep Tendon Reflexes: Reflexes are normal and symmetric.   Psychiatric:         Mood and Affect: Mood normal.         Behavior: Behavior normal.           Assessment/Plan:   1. Pain of left lower extremity  - US-EXTREMITY VENOUS LOWER UNILAT LEFT; Future  - REFERRAL TO SPORTS MEDICINE    2. Acute pain of left knee  - REFERRAL TO SPORTS MEDICINE    Other orders  - Cholecalciferol (VITAMIN D) 125 MCG (5000 UT) Cap; Take  by mouth.    Ultrasound was negative and patient was informed and advised her to take 2 Aleve twice a day with food for for 4 days, 2 Tylenol 3 times a day as needed and if is not better to follow-up with sports medicine and PCP      Differential diagnosis, natural history, supportive care, and indications for immediate follow-up discussed.

## 2020-06-19 ENCOUNTER — OFFICE VISIT (OUTPATIENT)
Dept: MEDICAL GROUP | Facility: CLINIC | Age: 35
End: 2020-06-19
Payer: COMMERCIAL

## 2020-06-19 ENCOUNTER — APPOINTMENT (OUTPATIENT)
Dept: RADIOLOGY | Facility: IMAGING CENTER | Age: 35
End: 2020-06-19
Attending: FAMILY MEDICINE
Payer: COMMERCIAL

## 2020-06-19 VITALS
TEMPERATURE: 98.2 F | OXYGEN SATURATION: 97 % | SYSTOLIC BLOOD PRESSURE: 118 MMHG | WEIGHT: 138.4 LBS | HEART RATE: 100 BPM | HEIGHT: 60 IN | RESPIRATION RATE: 16 BRPM | DIASTOLIC BLOOD PRESSURE: 74 MMHG | BODY MASS INDEX: 27.17 KG/M2

## 2020-06-19 DIAGNOSIS — M22.2X1 PATELLOFEMORAL SYNDROME, BILATERAL: ICD-10-CM

## 2020-06-19 DIAGNOSIS — M22.2X2 PATELLOFEMORAL SYNDROME, BILATERAL: ICD-10-CM

## 2020-06-19 DIAGNOSIS — M25.562 ACUTE PAIN OF LEFT KNEE: ICD-10-CM

## 2020-06-19 PROCEDURE — 99214 OFFICE O/P EST MOD 30 MIN: CPT | Performed by: FAMILY MEDICINE

## 2020-06-19 PROCEDURE — 73564 X-RAY EXAM KNEE 4 OR MORE: CPT | Mod: TC,LT | Performed by: FAMILY MEDICINE

## 2020-06-19 NOTE — PROGRESS NOTES
CHIEF COMPLAINT:  Luisa Nelson female presenting at the request of Bryant Nolasco MD for evaluation of knee pain.     Luisa Nelson is complaining of bilateral knee pain (LEFT > R)  present for several years.  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 No pertinent past medical history.    PMH:  has no past medical history of Allergy, Anemia, ASTHMA, Diabetes, or GERD (gastroesophageal reflux disease).  MEDS:   Current Outpatient Medications:   •  Cholecalciferol (VITAMIN D) 125 MCG (5000 UT) Cap, Take  by mouth., Disp: , Rfl:   •  omeprazole (PRILOSEC) 10 MG CAPSULE DELAYED RELEASE, Take 2 Caps by mouth every day. (Patient not taking: Reported on 1/10/2019), Disp: 60 Cap, Rfl: 0  •  loratadine (CLARITIN) 10 MG Tab, Take 10 mg by mouth every day., Disp: , Rfl:   ALLERGIES: No Known Allergies  SURGHX:   Past Surgical History:   Procedure Laterality Date   • PRIMARY C SECTION       SOCHX:  reports that she has never smoked. She has never used smokeless tobacco. She reports current alcohol use. She reports that she does not use drugs.  FH: Family history was reviewed, no pertinent findings to report     PHYSICAL EXAM:  /74 (BP Location: Left  arm, Patient Position: Sitting, BP Cuff Size: Adult)   Pulse 100   Temp 36.8 °C (98.2 °F) (Temporal)   Resp 16   Ht 1.524 m (5')   Wt 62.8 kg (138 lb 6.4 oz)   SpO2 97%   BMI 27.03 kg/m²      well-developed, well-nourished in no apparent distress, alert and oriented x 3.  Gait: normal      RIGHT Knee:  Slight Varus and No Swelling  Range of Motion Intact  Trace effusion  POSITIVE patellar grinding and No tenderness and no apprehension  Medial Joint Line Non-tender and NEGATIVE Bisi  Lateral Joint Line Non-tender and NEGATIVE Bisi  Trace Laxity with Varus stress  Trace Laxity with Valgus stress  Lachman's testing is Trace  Posterior Drawer Testing is Trace  The leg is otherwise neurovascularly intact    LEFT Knee:  Slight Varus and No Swelling   Range of Motion Intact  Trace effusion  POSITIVE patellar grinding and No tenderness and no apprehension  Medial Joint Line Tenderness and NEGATIVE Bisi  Lateral Joint Line Non-tender and NEGATIVE Bisi  Trace Laxity with Varus stress  Trace Laxity with Valgus stress  Lachman's testing is Trace  Posterior Drawer Testing is Trace  The leg is otherwise neurovascularly intact    Additional Findings: Poor single-leg squat mechanics bilaterally    1. Patellofemoral syndrome, bilateral     2. Acute pain of left knee (posterior, suspect Baker's cyst)       present for several years.  Had been inactive for 1 year then went hiking and started having pain 1 week later    Patellofemoral syndrome together with mild tricompartmental osteoarthritis likely causing knee effusion/Baker's cyst on the LEFT knee    Provided with home exercise program for her knees  Offered formal physical therapy, but patient politely declined at this time  Provided with knee brace for physical activity  Recommend yoga or Pilates since she has had spine issues in the past as well    Return in about 6 weeks (around 7/31/2020).   To see how she is doing with her knee brace, home exercise  program and Pilates/yoga                6/19/2020 9:46 AM     HISTORY/REASON FOR EXAM:  Atraumatic Pain/Swelling/Deformity.  LEFT knee pain.     TECHNIQUE/EXAM DESCRIPTION AND NUMBER OF VIEWS:  4 views of the LEFT knee.     COMPARISON: None     FINDINGS:  No focal soft tissue swelling or gross joint effusion.  Joint spaces are preserved.  Small osteophyte formation at the articular margins.  No fracture or dislocation.     IMPRESSION:     1.  No fracture or dislocation of LEFT knee.  2.  Minimal degenerative change.    taken here and reviewed by me      6/7/2020 12:55 PM     HISTORY/REASON FOR EXAM:  Left lower extremity pain        TECHNIQUE/EXAM DESCRIPTION:  Left lower extremity Doppler venous ultrasound was performed. Duplex Doppler and spectral analysis were performed.     COMPARISON:  None.     FINDINGS:     REAL-TIME GRAY-SCALE IMAGING:  Real-time gray-scale imaging reveals no evidence of focal wall thickening.     COLOR AND DUPLEX DOPPLER IMAGING:  The left common femoral vein, femoral vein, popliteal vein, and deep calf veins are fully compressible using a graded compression technique.  No intraluminal thrombus is identified within these veins.  Duplex Doppler wave forms demonstrate normal   phasicity and pulsatility.     IMPRESSION:     No evidence of left lower extremity deep venous thrombosis.    Thank you Bryant Nolasco MD for allowing me to participate in caring for your patient.

## 2020-07-24 ENCOUNTER — HOSPITAL ENCOUNTER (OUTPATIENT)
Dept: LAB | Facility: MEDICAL CENTER | Age: 35
End: 2020-07-24
Attending: PHYSICIAN ASSISTANT
Payer: COMMERCIAL

## 2020-07-24 LAB
25(OH)D3 SERPL-MCNC: 58 NG/ML (ref 30–100)
ALBUMIN SERPL BCP-MCNC: 4.7 G/DL (ref 3.2–4.9)
ALBUMIN/GLOB SERPL: 1.7 G/DL
ALP SERPL-CCNC: 60 U/L (ref 30–99)
ALT SERPL-CCNC: 23 U/L (ref 2–50)
ANION GAP SERPL CALC-SCNC: 15 MMOL/L (ref 7–16)
AST SERPL-CCNC: 22 U/L (ref 12–45)
BASOPHILS # BLD AUTO: 0.7 % (ref 0–1.8)
BASOPHILS # BLD: 0.06 K/UL (ref 0–0.12)
BILIRUB SERPL-MCNC: 0.7 MG/DL (ref 0.1–1.5)
BUN SERPL-MCNC: 12 MG/DL (ref 8–22)
CALCIUM SERPL-MCNC: 9.3 MG/DL (ref 8.5–10.5)
CHLORIDE SERPL-SCNC: 102 MMOL/L (ref 96–112)
CHOLEST SERPL-MCNC: 160 MG/DL (ref 100–199)
CO2 SERPL-SCNC: 21 MMOL/L (ref 20–33)
CREAT SERPL-MCNC: 0.61 MG/DL (ref 0.5–1.4)
EOSINOPHIL # BLD AUTO: 0.17 K/UL (ref 0–0.51)
EOSINOPHIL NFR BLD: 1.9 % (ref 0–6.9)
ERYTHROCYTE [DISTWIDTH] IN BLOOD BY AUTOMATED COUNT: 39.9 FL (ref 35.9–50)
FASTING STATUS PATIENT QL REPORTED: NORMAL
FSH SERPL-ACNC: 3.2 MIU/ML
GLOBULIN SER CALC-MCNC: 2.8 G/DL (ref 1.9–3.5)
GLUCOSE SERPL-MCNC: 91 MG/DL (ref 65–99)
HCT VFR BLD AUTO: 44.7 % (ref 37–47)
HDLC SERPL-MCNC: 80 MG/DL
HGB BLD-MCNC: 15 G/DL (ref 12–16)
IMM GRANULOCYTES # BLD AUTO: 0.04 K/UL (ref 0–0.11)
IMM GRANULOCYTES NFR BLD AUTO: 0.5 % (ref 0–0.9)
LDLC SERPL CALC-MCNC: 67 MG/DL
LH SERPL-ACNC: 8.1 IU/L
LYMPHOCYTES # BLD AUTO: 2.59 K/UL (ref 1–4.8)
LYMPHOCYTES NFR BLD: 29.4 % (ref 22–41)
MCH RBC QN AUTO: 30.3 PG (ref 27–33)
MCHC RBC AUTO-ENTMCNC: 33.6 G/DL (ref 33.6–35)
MCV RBC AUTO: 90.3 FL (ref 81.4–97.8)
MONOCYTES # BLD AUTO: 0.82 K/UL (ref 0–0.85)
MONOCYTES NFR BLD AUTO: 9.3 % (ref 0–13.4)
NEUTROPHILS # BLD AUTO: 5.14 K/UL (ref 2–7.15)
NEUTROPHILS NFR BLD: 58.2 % (ref 44–72)
NRBC # BLD AUTO: 0 K/UL
NRBC BLD-RTO: 0 /100 WBC
PLATELET # BLD AUTO: 285 K/UL (ref 164–446)
PMV BLD AUTO: 9.8 FL (ref 9–12.9)
POTASSIUM SERPL-SCNC: 4 MMOL/L (ref 3.6–5.5)
PROT SERPL-MCNC: 7.5 G/DL (ref 6–8.2)
RBC # BLD AUTO: 4.95 M/UL (ref 4.2–5.4)
SODIUM SERPL-SCNC: 138 MMOL/L (ref 135–145)
T3FREE SERPL-MCNC: 3.45 PG/ML (ref 2–4.4)
T4 FREE SERPL-MCNC: 1.13 NG/DL (ref 0.93–1.7)
TRIGL SERPL-MCNC: 65 MG/DL (ref 0–149)
TSH SERPL DL<=0.005 MIU/L-ACNC: 1.61 UIU/ML (ref 0.38–5.33)
WBC # BLD AUTO: 8.8 K/UL (ref 4.8–10.8)

## 2020-07-24 PROCEDURE — 36415 COLL VENOUS BLD VENIPUNCTURE: CPT

## 2020-07-24 PROCEDURE — 82679 ASSAY OF ESTRONE: CPT

## 2020-07-24 PROCEDURE — 84443 ASSAY THYROID STIM HORMONE: CPT

## 2020-07-24 PROCEDURE — 80053 COMPREHEN METABOLIC PANEL: CPT

## 2020-07-24 PROCEDURE — 84439 ASSAY OF FREE THYROXINE: CPT

## 2020-07-24 PROCEDURE — 84481 FREE ASSAY (FT-3): CPT

## 2020-07-24 PROCEDURE — 83001 ASSAY OF GONADOTROPIN (FSH): CPT

## 2020-07-24 PROCEDURE — 82306 VITAMIN D 25 HYDROXY: CPT

## 2020-07-24 PROCEDURE — 80061 LIPID PANEL: CPT

## 2020-07-24 PROCEDURE — 86800 THYROGLOBULIN ANTIBODY: CPT

## 2020-07-24 PROCEDURE — 85025 COMPLETE CBC W/AUTO DIFF WBC: CPT

## 2020-07-24 PROCEDURE — 83002 ASSAY OF GONADOTROPIN (LH): CPT

## 2020-07-26 LAB — THYROGLOB AB SERPL-ACNC: <0.9 IU/ML (ref 0–4)

## 2020-07-28 LAB — ESTRONE SERPL-MCNC: 91.1 PG/ML

## 2020-07-31 ENCOUNTER — OFFICE VISIT (OUTPATIENT)
Dept: MEDICAL GROUP | Facility: CLINIC | Age: 35
End: 2020-07-31
Payer: COMMERCIAL

## 2020-07-31 VITALS
OXYGEN SATURATION: 96 % | WEIGHT: 138.4 LBS | HEIGHT: 60 IN | RESPIRATION RATE: 16 BRPM | DIASTOLIC BLOOD PRESSURE: 72 MMHG | BODY MASS INDEX: 27.17 KG/M2 | HEART RATE: 78 BPM | SYSTOLIC BLOOD PRESSURE: 116 MMHG | TEMPERATURE: 98.3 F

## 2020-07-31 DIAGNOSIS — M22.2X1 PATELLOFEMORAL SYNDROME, BILATERAL: ICD-10-CM

## 2020-07-31 DIAGNOSIS — M25.562 ACUTE PAIN OF LEFT KNEE: ICD-10-CM

## 2020-07-31 DIAGNOSIS — M22.2X2 PATELLOFEMORAL SYNDROME, BILATERAL: ICD-10-CM

## 2020-07-31 PROCEDURE — 99213 OFFICE O/P EST LOW 20 MIN: CPT | Performed by: FAMILY MEDICINE

## 2020-07-31 ASSESSMENT — FIBROSIS 4 INDEX: FIB4 SCORE: 0.55

## 2020-08-01 NOTE — PROGRESS NOTES
CHIEF COMPLAINT:  Luisa Nelson female presenting at the request of Bryant Nolasco MD for evaluation of knee pain.     Luisa Nelson is complaining of bilateral knee pain (LEFT > R)  present for several years.  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  Gait: normal      RIGHT Knee:  Slight Varus and No Swelling  Range of Motion Intact  Trace effusion  POSITIVE patellar grinding and No tenderness and no apprehension  Medial Joint Line Non-tender and NEGATIVE Bisi  Lateral Joint Line Non-tender and NEGATIVE Bisi  Trace Laxity with Varus stress  Trace Laxity with Valgus stress  Lachman's testing is Trace  Posterior Drawer Testing is Trace  The leg is otherwise neurovascularly intact    LEFT Knee:  Slight Varus and No Swelling   Range of Motion Intact  Trace effusion  POSITIVE patellar grinding and No tenderness and no apprehension  NO medial Joint Line Tenderness and NEGATIVE Bisi  Lateral Joint Line Non-tender and NEGATIVE Bisi  Trace Laxity with Varus stress  Trace Laxity with Valgus stress  Lachman's testing is Trace  Posterior Drawer Testing is Trace  The leg is otherwise neurovascularly intact    Additional Findings: Poor single-leg squat mechanics bilaterally    1. Patellofemoral syndrome, bilateral      2. Acute pain of left knee (posterior, suspect Baker's cyst)       present for several years.  Had been inactive for 1 year then went hiking and started having pain 1 week later    Patellofemoral syndrome together with mild tricompartmental osteoarthritis likely causing knee effusion/Baker's cyst on the LEFT knee    Approximately 80% better with home exercise program alone  Continue Pilates and yoga    At this point, she is doing so well on her own we consider follow-up as needed  We can always consider formal physical therapy or possibly intra-articular corticosteroid injection if symptoms persist or worsen                6/19/2020 9:46 AM     HISTORY/REASON FOR EXAM:  Atraumatic Pain/Swelling/Deformity.  LEFT knee pain.     TECHNIQUE/EXAM DESCRIPTION AND NUMBER OF VIEWS:  4 views of the LEFT knee.     COMPARISON: None     FINDINGS:  No focal soft tissue swelling or gross joint effusion.  Joint spaces are preserved.  Small osteophyte formation at the articular margins.  No fracture or dislocation.     IMPRESSION:     1.  No fracture or dislocation of LEFT knee.  2.  Minimal degenerative change.    taken here and reviewed by me      6/7/2020 12:55 PM     HISTORY/REASON FOR EXAM:  Left lower extremity pain        TECHNIQUE/EXAM DESCRIPTION:  Left lower extremity Doppler venous ultrasound was performed. Duplex Doppler and spectral analysis were performed.     COMPARISON:  None.     FINDINGS:     REAL-TIME GRAY-SCALE IMAGING:  Real-time gray-scale imaging reveals no evidence of focal wall thickening.     COLOR AND DUPLEX DOPPLER IMAGING:  The left common femoral vein, femoral vein, popliteal vein, and deep calf veins are fully compressible using a graded compression technique.  No intraluminal thrombus is identified within these veins.  Duplex Doppler wave forms demonstrate normal   phasicity and pulsatility.     IMPRESSION:     No evidence of left lower extremity deep venous thrombosis.    Thank you Bryant Nolasco MD  for allowing me to participate in caring for your patient.

## 2022-02-24 ENCOUNTER — HOSPITAL ENCOUNTER (OUTPATIENT)
Facility: MEDICAL CENTER | Age: 37
End: 2022-02-24
Attending: OBSTETRICS & GYNECOLOGY
Payer: COMMERCIAL

## 2022-02-24 PROCEDURE — 88175 CYTOPATH C/V AUTO FLUID REDO: CPT

## 2022-02-24 PROCEDURE — 87624 HPV HI-RISK TYP POOLED RSLT: CPT

## 2022-02-27 LAB — AMBIGUOUS DTTM AMBI4: NORMAL

## 2022-02-28 LAB — CYTOLOGY REG CYTOL: NORMAL

## 2022-03-04 ENCOUNTER — HOSPITAL ENCOUNTER (OUTPATIENT)
Dept: LAB | Facility: MEDICAL CENTER | Age: 37
End: 2022-03-04
Attending: PHYSICIAN ASSISTANT
Payer: COMMERCIAL

## 2022-03-04 LAB
25(OH)D3 SERPL-MCNC: 69 NG/ML (ref 30–100)
ALBUMIN SERPL BCP-MCNC: 4.7 G/DL (ref 3.2–4.9)
ALBUMIN/GLOB SERPL: 1.5 G/DL
ALP SERPL-CCNC: 55 U/L (ref 30–99)
ALT SERPL-CCNC: 9 U/L (ref 2–50)
ANION GAP SERPL CALC-SCNC: 14 MMOL/L (ref 7–16)
APPEARANCE UR: CLEAR
AST SERPL-CCNC: 13 U/L (ref 12–45)
BASOPHILS # BLD AUTO: 0.7 % (ref 0–1.8)
BASOPHILS # BLD: 0.04 K/UL (ref 0–0.12)
BILIRUB SERPL-MCNC: 1 MG/DL (ref 0.1–1.5)
BILIRUB UR QL STRIP.AUTO: NEGATIVE
BUN SERPL-MCNC: 14 MG/DL (ref 8–22)
CALCIUM SERPL-MCNC: 9.6 MG/DL (ref 8.5–10.5)
CHLORIDE SERPL-SCNC: 105 MMOL/L (ref 96–112)
CHOLEST SERPL-MCNC: 156 MG/DL (ref 100–199)
CO2 SERPL-SCNC: 21 MMOL/L (ref 20–33)
COLOR UR: YELLOW
CREAT SERPL-MCNC: 0.67 MG/DL (ref 0.5–1.4)
EOSINOPHIL # BLD AUTO: 0.17 K/UL (ref 0–0.51)
EOSINOPHIL NFR BLD: 2.8 % (ref 0–6.9)
ERYTHROCYTE [DISTWIDTH] IN BLOOD BY AUTOMATED COUNT: 42.4 FL (ref 35.9–50)
FASTING STATUS PATIENT QL REPORTED: NORMAL
GLOBULIN SER CALC-MCNC: 3.1 G/DL (ref 1.9–3.5)
GLUCOSE SERPL-MCNC: 75 MG/DL (ref 65–99)
GLUCOSE UR STRIP.AUTO-MCNC: NEGATIVE MG/DL
HCT VFR BLD AUTO: 43.2 % (ref 37–47)
HDLC SERPL-MCNC: 78 MG/DL
HGB BLD-MCNC: 14.3 G/DL (ref 12–16)
IMM GRANULOCYTES # BLD AUTO: 0.01 K/UL (ref 0–0.11)
IMM GRANULOCYTES NFR BLD AUTO: 0.2 % (ref 0–0.9)
KETONES UR STRIP.AUTO-MCNC: NEGATIVE MG/DL
LDLC SERPL CALC-MCNC: 70 MG/DL
LEUKOCYTE ESTERASE UR QL STRIP.AUTO: NEGATIVE
LYMPHOCYTES # BLD AUTO: 2.37 K/UL (ref 1–4.8)
LYMPHOCYTES NFR BLD: 38.7 % (ref 22–41)
MCH RBC QN AUTO: 29.9 PG (ref 27–33)
MCHC RBC AUTO-ENTMCNC: 33.1 G/DL (ref 33.6–35)
MCV RBC AUTO: 90.2 FL (ref 81.4–97.8)
MICRO URNS: NORMAL
MONOCYTES # BLD AUTO: 0.67 K/UL (ref 0–0.85)
MONOCYTES NFR BLD AUTO: 10.9 % (ref 0–13.4)
NEUTROPHILS # BLD AUTO: 2.87 K/UL (ref 2–7.15)
NEUTROPHILS NFR BLD: 46.7 % (ref 44–72)
NITRITE UR QL STRIP.AUTO: NEGATIVE
NRBC # BLD AUTO: 0 K/UL
NRBC BLD-RTO: 0 /100 WBC
PH UR STRIP.AUTO: 6.5 [PH] (ref 5–8)
PLATELET # BLD AUTO: 271 K/UL (ref 164–446)
PMV BLD AUTO: 9.7 FL (ref 9–12.9)
POTASSIUM SERPL-SCNC: 4.3 MMOL/L (ref 3.6–5.5)
PROT SERPL-MCNC: 7.8 G/DL (ref 6–8.2)
PROT UR QL STRIP: NEGATIVE MG/DL
RBC # BLD AUTO: 4.79 M/UL (ref 4.2–5.4)
RBC UR QL AUTO: NEGATIVE
SODIUM SERPL-SCNC: 140 MMOL/L (ref 135–145)
SP GR UR STRIP.AUTO: 1.01
T3FREE SERPL-MCNC: 3.39 PG/ML (ref 2–4.4)
T4 FREE SERPL-MCNC: 1.22 NG/DL (ref 0.93–1.7)
TRIGL SERPL-MCNC: 41 MG/DL (ref 0–149)
TSH SERPL DL<=0.005 MIU/L-ACNC: 1.43 UIU/ML (ref 0.38–5.33)
UROBILINOGEN UR STRIP.AUTO-MCNC: 0.2 MG/DL
WBC # BLD AUTO: 6.1 K/UL (ref 4.8–10.8)

## 2022-03-04 PROCEDURE — 80053 COMPREHEN METABOLIC PANEL: CPT

## 2022-03-04 PROCEDURE — 82306 VITAMIN D 25 HYDROXY: CPT

## 2022-03-04 PROCEDURE — 84481 FREE ASSAY (FT-3): CPT

## 2022-03-04 PROCEDURE — 84439 ASSAY OF FREE THYROXINE: CPT

## 2022-03-04 PROCEDURE — 36415 COLL VENOUS BLD VENIPUNCTURE: CPT

## 2022-03-04 PROCEDURE — 84443 ASSAY THYROID STIM HORMONE: CPT

## 2022-03-04 PROCEDURE — 81003 URINALYSIS AUTO W/O SCOPE: CPT

## 2022-03-04 PROCEDURE — 80061 LIPID PANEL: CPT

## 2022-03-04 PROCEDURE — 85025 COMPLETE CBC W/AUTO DIFF WBC: CPT

## 2022-03-14 LAB
HPV HR 12 DNA CVX QL NAA+PROBE: NEGATIVE
HPV16 DNA SPEC QL NAA+PROBE: NEGATIVE
HPV18 DNA SPEC QL NAA+PROBE: NEGATIVE
SPECIMEN SOURCE: NORMAL